# Patient Record
Sex: MALE | Race: WHITE | NOT HISPANIC OR LATINO | Employment: STUDENT | URBAN - METROPOLITAN AREA
[De-identification: names, ages, dates, MRNs, and addresses within clinical notes are randomized per-mention and may not be internally consistent; named-entity substitution may affect disease eponyms.]

---
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
## 2025-06-02 NOTE — TELEPHONE ENCOUNTER
Contacted mom to reschedule appt that was cancelled on 12/29. New appt date is 1/12 for 2:00. Mom verbalized understanding.               ----- Message from Liana Gómez sent at 1/3/2023 12:11 PM CST -----  Regarding: Patient advice  Contact: Pt mom  Pt mom called in regards to rescheduling appointment     Please advise , Pt mom can be reached at [phone]     Quality 226: Preventive Care And Screening: Tobacco Use: Screening And Cessation Intervention: Patient screened for tobacco use and is an ex/non-smoker Detail Level: Detailed Quality 130: Documentation Of Current Medications In The Medical Record: Current Medications Documented Quality 47: Advance Care Plan: Advance Care Planning discussed and documented in the medical record; patient did not wish or was not able to name a surrogate decision maker or provide an advance care plan.